# Patient Record
Sex: FEMALE | Race: OTHER | URBAN - METROPOLITAN AREA
[De-identification: names, ages, dates, MRNs, and addresses within clinical notes are randomized per-mention and may not be internally consistent; named-entity substitution may affect disease eponyms.]

---

## 2024-07-10 ENCOUNTER — TELEPHONE (OUTPATIENT)
Age: 29
End: 2024-07-10

## 2024-07-23 ENCOUNTER — INITIAL PRENATAL (OUTPATIENT)
Age: 29
End: 2024-07-23

## 2024-07-23 ENCOUNTER — PROCEDURE VISIT (OUTPATIENT)
Age: 29
End: 2024-07-23

## 2024-07-23 VITALS — WEIGHT: 163 LBS

## 2024-07-23 DIAGNOSIS — O36.80X0 ULTRASOUND SCAN TO CONFIRM FETAL VIABILITY WITH HISTORY OF MISCARRIAGE: Primary | ICD-10-CM

## 2024-07-23 DIAGNOSIS — Z34.83 ENCOUNTER FOR SUPERVISION OF OTHER NORMAL PREGNANCY IN THIRD TRIMESTER: ICD-10-CM

## 2024-07-23 DIAGNOSIS — Z87.59 ULTRASOUND SCAN TO CONFIRM FETAL VIABILITY WITH HISTORY OF MISCARRIAGE: Primary | ICD-10-CM

## 2024-07-23 DIAGNOSIS — Z34.01 ENCOUNTER FOR SUPERVISION OF NORMAL FIRST PREGNANCY IN FIRST TRIMESTER: ICD-10-CM

## 2024-07-23 DIAGNOSIS — Z34.83 PRENATAL CARE, SUBSEQUENT PREGNANCY IN THIRD TRIMESTER: Primary | ICD-10-CM

## 2024-07-23 DIAGNOSIS — Z3A.26 26 WEEKS GESTATION OF PREGNANCY: ICD-10-CM

## 2024-07-23 LAB
ABO + RH BLD: NORMAL
BLOOD BANK CMNT PATIENT-IMP: NORMAL
BLOOD GROUP ANTIBODIES SERPL: NORMAL
SPECIMEN EXP DATE BLD: NORMAL

## 2024-07-23 RX ORDER — ASPIRIN 81 MG/1
81 TABLET ORAL DAILY
Qty: 90 TABLET | Refills: 0 | OUTPATIENT
Start: 2024-07-23

## 2024-07-23 RX ORDER — PNV NO.95/FERROUS FUM/FOLIC AC 28MG-0.8MG
1 TABLET ORAL DAILY
Qty: 90 TABLET | Refills: 5 | OUTPATIENT
Start: 2024-07-23

## 2024-07-23 NOTE — PROGRESS NOTES
Initial Prenatal Note     CC: Amenorrhea, positive pregnancy test     HPI:  Azul Moreno is a 28 y.o.  who presents for initial prenatal appointment. Since her LMP she has experienced nausea in early pregnancy but is doing better. She denies dysuria, discharge, vaginal bleeding.     LMP history:  The date of her LMP is not certain.  Her last menstrual period was normal.        Ultrasound data:  She had an  ultrasound done by the ultrasound tech today which revealed a viable marmolejo pregnancy with a gestational age of 23 weeks and 6 days giving an EDC of 2024.     She is dated by US.     Relevant past pregnancy history:  She has the following pregnancy history:    She does not history of  delivery.     Relevant past medical history:   Noncontributory       Relevant social history:  Her partner's name is Ciaran.        Initial OB note:  Please refer to problem list for concerns        Substance history: negative for alcohol, tobacco and street drugs.           Positive for nothing.  Exposure history: There is/are no indoor cat/s in the home.  The patient was instructed to not change the cat litter.   She admits close contact with children on a regular basis.   She has had chicken pox or the vaccine in the past.   Patient denies issues with domestic violence.     Genetic Screening/Teratology Counseling: (Includes patient, baby's father, or anyone in either family with:)  1.  Patient's age >/= 35 at EDC?--no  2.  Thalassemia (Dutch, Greek, Mediterranean, or  background): MCV<80?--no.     3.  Neural tube defect (meningomyelocele, spina bifida, anencephaly)?--no.   4.  Congenital heart defect?--no.  5.  Down syndrome?--no.   6.  Camilo-Sachs (Gnosticism, Slovak Belarusian)?--no.   7.  Canavan's Disease?--no.   8.  Familial Dysautonomia?--no.   9.  Sickle cell disease or trait ()?--no   The patient has not been tested for sickle trait  10.  Hemophilia or other blood disorders?--no.   11.

## 2024-07-23 NOTE — PROGRESS NOTES
Initial Prenatal Note    CC: Amenorrhea, positive pregnancy test    HPI:  Azul Moreno is a 28 y.o.  who presents for initial prenatal appointment. Since her LMP she has experienced nausea in early pregnancy but is doing better. She denies dysuria, discharge, vaginal bleeding.    LMP history:  The date of her LMP is not certain.  Her last menstrual period was normal.       Ultrasound data:  She had an  ultrasound done by the ultrasound tech today which revealed a viable marmolejo pregnancy with a gestational age of 23 weeks and 6 days giving an EDC of 2024.    She is dated by US.    Relevant past pregnancy history:  She has the following pregnancy history:    She does not history of  delivery.    Relevant past medical history:   Noncontributory      Relevant social history:  Her partner's name is Ciaran.    1. Have you been to the ER, urgent care clinic, or hospitalized since your last visit?No    2. Have you seen or consulted any other health care providers outside of the [facility] since your last visit? No    She declines  a chaperone during the gynecologic exam today.      : Jeramie  ID# [id number]    : Aisha  ID# [id number]    Elizabeth Mesa MA

## 2024-07-24 LAB
BACTERIA SPEC CULT: NORMAL
ERYTHROCYTE [DISTWIDTH] IN BLOOD BY AUTOMATED COUNT: 14.6 % (ref 11.5–14.5)
HBV SURFACE AG SER QL: <0.1 INDEX
HBV SURFACE AG SER QL: NEGATIVE
HCT VFR BLD AUTO: 32.1 % (ref 35–47)
HCV AB SER IA-ACNC: 0.09 INDEX
HCV AB SERPL QL IA: NONREACTIVE
HGB BLD-MCNC: 10.5 G/DL (ref 11.5–16)
HIV 1+2 AB+HIV1 P24 AG SERPL QL IA: NONREACTIVE
HIV 1/2 RESULT COMMENT: NORMAL
MCH RBC QN AUTO: 28.3 PG (ref 26–34)
MCHC RBC AUTO-ENTMCNC: 32.7 G/DL (ref 30–36.5)
MCV RBC AUTO: 86.5 FL (ref 80–99)
NRBC # BLD: 0 K/UL (ref 0–0.01)
NRBC BLD-RTO: 0 PER 100 WBC
PLATELET # BLD AUTO: 246 K/UL (ref 150–400)
PMV BLD AUTO: 10.8 FL (ref 8.9–12.9)
RBC # BLD AUTO: 3.71 M/UL (ref 3.8–5.2)
RUBV IGG SERPL IA-ACNC: NORMAL IU/ML
SERVICE CMNT-IMP: NORMAL
WBC # BLD AUTO: 6.4 K/UL (ref 3.6–11)

## 2024-07-26 LAB
HGB A MFR BLD: 97.7 % (ref 96.4–98.8)
HGB A2 MFR BLD COLUMN CHROM: 2.3 % (ref 1.8–3.2)
HGB F MFR BLD: 0 % (ref 0–2)
HGB FRACT BLD-IMP: NORMAL
HGB S MFR BLD: 0 %
T PALLIDUM AB SER QL IA: NON REACTIVE
VZV IGG SER IA-ACNC: 1456 INDEX

## 2024-07-29 LAB
C TRACH RRNA SPEC QL NAA+PROBE: NEGATIVE
N GONORRHOEA RRNA SPEC QL NAA+PROBE: NEGATIVE
SPECIMEN SOURCE: NORMAL
T VAGINALIS RRNA SPEC QL NAA+PROBE: NEGATIVE

## 2024-08-08 LAB — T PALLIDUM AB SER QL IA: NORMAL

## 2024-08-20 ENCOUNTER — ROUTINE PRENATAL (OUTPATIENT)
Age: 29
End: 2024-08-20

## 2024-08-20 ENCOUNTER — LAB (OUTPATIENT)
Age: 29
End: 2024-08-20

## 2024-08-20 VITALS — WEIGHT: 165 LBS | SYSTOLIC BLOOD PRESSURE: 118 MMHG | DIASTOLIC BLOOD PRESSURE: 80 MMHG

## 2024-08-20 DIAGNOSIS — Z3A.27 27 WEEKS GESTATION OF PREGNANCY: ICD-10-CM

## 2024-08-20 DIAGNOSIS — Z3A.26 26 WEEKS GESTATION OF PREGNANCY: ICD-10-CM

## 2024-08-20 DIAGNOSIS — Z3A.27 27 WEEKS GESTATION OF PREGNANCY: Primary | ICD-10-CM

## 2024-08-20 PROCEDURE — 0502F SUBSEQUENT PRENATAL CARE: CPT

## 2024-08-20 RX ORDER — MAGNESIUM OXIDE 400 MG/1
400 TABLET ORAL DAILY
Qty: 30 TABLET | Refills: 1 | Status: SHIPPED | OUTPATIENT
Start: 2024-08-20

## 2024-08-20 RX ORDER — PNV NO.95/FERROUS FUM/FOLIC AC 28MG-0.8MG
1 TABLET ORAL DAILY
Qty: 90 TABLET | Refills: 5 | Status: SHIPPED | OUTPATIENT
Start: 2024-08-20

## 2024-08-20 SDOH — ECONOMIC STABILITY: FOOD INSECURITY: WITHIN THE PAST 12 MONTHS, YOU WORRIED THAT YOUR FOOD WOULD RUN OUT BEFORE YOU GOT MONEY TO BUY MORE.: NEVER TRUE

## 2024-08-20 SDOH — ECONOMIC STABILITY: FOOD INSECURITY: WITHIN THE PAST 12 MONTHS, THE FOOD YOU BOUGHT JUST DIDN'T LAST AND YOU DIDN'T HAVE MONEY TO GET MORE.: NEVER TRUE

## 2024-08-20 SDOH — ECONOMIC STABILITY: INCOME INSECURITY: HOW HARD IS IT FOR YOU TO PAY FOR THE VERY BASICS LIKE FOOD, HOUSING, MEDICAL CARE, AND HEATING?: NOT VERY HARD

## 2024-08-20 ASSESSMENT — PATIENT HEALTH QUESTIONNAIRE - PHQ9
SUM OF ALL RESPONSES TO PHQ QUESTIONS 1-9: 1
SUM OF ALL RESPONSES TO PHQ9 QUESTIONS 1 & 2: 1
1. LITTLE INTEREST OR PLEASURE IN DOING THINGS: NOT AT ALL
2. FEELING DOWN, DEPRESSED OR HOPELESS: SEVERAL DAYS
SUM OF ALL RESPONSES TO PHQ QUESTIONS 1-9: 1

## 2024-08-20 NOTE — PROGRESS NOTES
EUGENIO at 27w6d.  Doing well, FM+, denies LOF/VB/cxns.  PNV, mag ox sent for leg cramping.  Pt requesting housing assistance information per survey--pt info forwarded to Iman to manage.   Gtt, third tri today.  Considering TDAP.   Discussed increasing water, protein.  RTO 2 wks.  Sae Brasher, LENORA - CNM

## 2024-08-21 LAB
ERYTHROCYTE [DISTWIDTH] IN BLOOD BY AUTOMATED COUNT: 15.1 % (ref 11.5–14.5)
GLUCOSE 1H P 100 G GLC PO SERPL-MCNC: 212 MG/DL (ref 65–140)
HCT VFR BLD AUTO: 31.2 % (ref 35–47)
HGB BLD-MCNC: 9.9 G/DL (ref 11.5–16)
HIV 1+2 AB+HIV1 P24 AG SERPL QL IA: NONREACTIVE
HIV 1/2 RESULT COMMENT: NORMAL
MCH RBC QN AUTO: 28.7 PG (ref 26–34)
MCHC RBC AUTO-ENTMCNC: 31.7 G/DL (ref 30–36.5)
MCV RBC AUTO: 90.4 FL (ref 80–99)
NRBC # BLD: 0 K/UL (ref 0–0.01)
NRBC BLD-RTO: 0 PER 100 WBC
PLATELET # BLD AUTO: 222 K/UL (ref 150–400)
PMV BLD AUTO: 11.8 FL (ref 8.9–12.9)
RBC # BLD AUTO: 3.45 M/UL (ref 3.8–5.2)
RPR SER QL: NONREACTIVE
WBC # BLD AUTO: 6.2 K/UL (ref 3.6–11)

## 2024-08-23 DIAGNOSIS — O24.419 GESTATIONAL DIABETES MELLITUS (GDM) IN THIRD TRIMESTER, GESTATIONAL DIABETES METHOD OF CONTROL UNSPECIFIED: Primary | ICD-10-CM

## 2024-08-23 RX ORDER — GLUCOSAMINE HCL/CHONDROITIN SU 500-400 MG
CAPSULE ORAL
Qty: 100 STRIP | Refills: 2 | Status: SHIPPED | OUTPATIENT
Start: 2024-08-23

## 2024-08-23 RX ORDER — LANCETS 30 GAUGE
1 EACH MISCELLANEOUS 4 TIMES DAILY
Qty: 100 EACH | Refills: 2 | Status: SHIPPED | OUTPATIENT
Start: 2024-08-23

## 2024-09-03 ENCOUNTER — ROUTINE PRENATAL (OUTPATIENT)
Age: 29
End: 2024-09-03

## 2024-09-03 VITALS
OXYGEN SATURATION: 96 % | RESPIRATION RATE: 16 BRPM | SYSTOLIC BLOOD PRESSURE: 106 MMHG | HEART RATE: 84 BPM | WEIGHT: 165 LBS | HEIGHT: 61 IN | BODY MASS INDEX: 31.15 KG/M2 | TEMPERATURE: 98.4 F | DIASTOLIC BLOOD PRESSURE: 73 MMHG

## 2024-09-03 DIAGNOSIS — Z34.93 PRENATAL CARE IN THIRD TRIMESTER: ICD-10-CM

## 2024-09-03 DIAGNOSIS — O24.410 DIET CONTROLLED GESTATIONAL DIABETES MELLITUS (GDM) IN THIRD TRIMESTER: Primary | ICD-10-CM

## 2024-09-03 DIAGNOSIS — Z3A.29 29 WEEKS GESTATION OF PREGNANCY: ICD-10-CM

## 2024-09-03 PROCEDURE — 0502F SUBSEQUENT PRENATAL CARE: CPT | Performed by: ADVANCED PRACTICE MIDWIFE

## 2024-09-03 NOTE — PROGRESS NOTES
EUGENIO:  Azul Moreno is a 29 y.o.  at 29w6d  who presents for routine OB appointment    Chief Complaint   Patient presents with    Routine Prenatal Visit          /73   Pulse 84   Temp 98.4 °F (36.9 °C)   Resp 16   Ht 1.549 m (5' 1\")   Wt 74.8 kg (165 lb)   LMP  (LMP Unknown)   SpO2 96%   BMI 31.18 kg/m²   FHTs: 150s  FH: 30    Tdap reviewed, Pt desires today., but left prior to getting it    ABO: B POSITIVE     RhoGAM: N/A     Subjective: Doing well, no complaints. Good FM. Denies vaginal bleeding, Leaking of fluid, regular contractions.    Still has not see DM educator, we called to get patient scheduled, she now has a appointment  at 9:30 AM instructions given how to get there.  She was instructed to bring her glucometer and they will teach her how to use it.     Third trimester precautions given.   labor precautions reviewed.     Follow up in 2 weeks.     LENORA Bermudez CNM

## 2024-09-11 ENCOUNTER — TELEPHONE (OUTPATIENT)
Age: 29
End: 2024-09-11

## 2024-09-12 ENCOUNTER — ROUTINE PRENATAL (OUTPATIENT)
Age: 29
End: 2024-09-12
Payer: COMMERCIAL

## 2024-09-12 VITALS — DIASTOLIC BLOOD PRESSURE: 75 MMHG | SYSTOLIC BLOOD PRESSURE: 109 MMHG | HEART RATE: 79 BPM

## 2024-09-12 DIAGNOSIS — O24.919 DIABETES MELLITUS DURING PREGNANCY, ANTEPARTUM, UNSPECIFIED DIABETES MELLITUS TYPE: Primary | ICD-10-CM

## 2024-09-12 PROCEDURE — 99204 OFFICE O/P NEW MOD 45 MIN: CPT | Performed by: OBSTETRICS & GYNECOLOGY

## 2024-09-12 PROCEDURE — 76811 OB US DETAILED SNGL FETUS: CPT | Performed by: OBSTETRICS & GYNECOLOGY

## 2024-09-17 ENCOUNTER — ROUTINE PRENATAL (OUTPATIENT)
Age: 29
End: 2024-09-17
Payer: COMMERCIAL

## 2024-09-17 VITALS
BODY MASS INDEX: 31.04 KG/M2 | HEIGHT: 61 IN | HEART RATE: 81 BPM | TEMPERATURE: 97.9 F | WEIGHT: 164.4 LBS | SYSTOLIC BLOOD PRESSURE: 122 MMHG | RESPIRATION RATE: 16 BRPM | DIASTOLIC BLOOD PRESSURE: 77 MMHG | OXYGEN SATURATION: 96 %

## 2024-09-17 DIAGNOSIS — O24.419 GESTATIONAL DIABETES MELLITUS (GDM) IN THIRD TRIMESTER, GESTATIONAL DIABETES METHOD OF CONTROL UNSPECIFIED: Primary | ICD-10-CM

## 2024-09-17 DIAGNOSIS — Z3A.31 31 WEEKS GESTATION OF PREGNANCY: ICD-10-CM

## 2024-09-17 DIAGNOSIS — Z3A.26 26 WEEKS GESTATION OF PREGNANCY: ICD-10-CM

## 2024-09-17 DIAGNOSIS — Z34.93 PRENATAL CARE IN THIRD TRIMESTER: ICD-10-CM

## 2024-09-17 PROCEDURE — 90471 IMMUNIZATION ADMIN: CPT | Performed by: ADVANCED PRACTICE MIDWIFE

## 2024-09-17 PROCEDURE — 90715 TDAP VACCINE 7 YRS/> IM: CPT | Performed by: ADVANCED PRACTICE MIDWIFE

## 2024-09-17 PROCEDURE — 0502F SUBSEQUENT PRENATAL CARE: CPT | Performed by: ADVANCED PRACTICE MIDWIFE

## 2024-09-20 ENCOUNTER — TELEMEDICINE (OUTPATIENT)
Age: 29
End: 2024-09-20

## 2024-09-20 DIAGNOSIS — O24.919 DIABETES MELLITUS DURING PREGNANCY, ANTEPARTUM, UNSPECIFIED DIABETES MELLITUS TYPE: ICD-10-CM

## 2024-09-20 DIAGNOSIS — O24.410 DIET CONTROLLED GESTATIONAL DIABETES MELLITUS (GDM), ANTEPARTUM: Primary | ICD-10-CM

## 2024-09-20 PROBLEM — O24.419 GDM (GESTATIONAL DIABETES MELLITUS): Status: ACTIVE | Noted: 2024-09-20

## 2024-10-02 ENCOUNTER — ROUTINE PRENATAL (OUTPATIENT)
Age: 29
End: 2024-10-02

## 2024-10-02 VITALS
HEART RATE: 77 BPM | WEIGHT: 166.6 LBS | DIASTOLIC BLOOD PRESSURE: 53 MMHG | OXYGEN SATURATION: 98 % | HEIGHT: 61 IN | BODY MASS INDEX: 31.45 KG/M2 | TEMPERATURE: 98 F | SYSTOLIC BLOOD PRESSURE: 98 MMHG | RESPIRATION RATE: 16 BRPM

## 2024-10-02 DIAGNOSIS — Z3A.26 26 WEEKS GESTATION OF PREGNANCY: Primary | ICD-10-CM

## 2024-10-02 PROCEDURE — 0502F SUBSEQUENT PRENATAL CARE: CPT

## 2024-10-02 NOTE — PROGRESS NOTES
EUGENIO at 34wk.  Doing well. FM+, denies LOF/VB/cxns.  Blood sugar log reviewed 3/12 fastings abnormal.  Pt having some low blood sugar readings, notes she has no appetite. Reviewed increasing low carb calories and water. Pt has gained little weight this pregnancy. Reviewed the importance of smart snacking, can consider DE. She previously declined DE. Some values seem inaccurate, pt describes taking sugars correctly. Uncertain if due to monitor. Recommending DE for assessment.    Encouraged to bring logs to [facility] tomorrow.   RTO 2 wks.   Sae Brasher, LENORA - CNM

## 2024-10-03 ENCOUNTER — ROUTINE PRENATAL (OUTPATIENT)
Age: 29
End: 2024-10-03
Payer: COMMERCIAL

## 2024-10-03 ENCOUNTER — TELEPHONE (OUTPATIENT)
Age: 29
End: 2024-10-03

## 2024-10-03 VITALS — DIASTOLIC BLOOD PRESSURE: 72 MMHG | HEART RATE: 79 BPM | SYSTOLIC BLOOD PRESSURE: 106 MMHG

## 2024-10-03 DIAGNOSIS — O24.419 GESTATIONAL DIABETES MELLITUS (GDM), ANTEPARTUM, GESTATIONAL DIABETES METHOD OF CONTROL UNSPECIFIED: Primary | ICD-10-CM

## 2024-10-03 PROCEDURE — 99214 OFFICE O/P EST MOD 30 MIN: CPT

## 2024-10-03 PROCEDURE — 76819 FETAL BIOPHYS PROFIL W/O NST: CPT | Performed by: STUDENT IN AN ORGANIZED HEALTH CARE EDUCATION/TRAINING PROGRAM

## 2024-10-03 PROCEDURE — 99213 OFFICE O/P EST LOW 20 MIN: CPT | Performed by: STUDENT IN AN ORGANIZED HEALTH CARE EDUCATION/TRAINING PROGRAM

## 2024-10-03 NOTE — PROGRESS NOTES
Patient was seen 10/3/2024      Please look under media to view full consult and ultrasound report in ViewPoint.         Tiffani Funes MD   Maternal Fetal Medicine

## 2024-10-03 NOTE — PROCEDURES
PATIENT: SHEELA ASCENCIO   -  : 1995   -  DOS:10/03/2024   -  INTERPRETING PROVIDER:Tiffani Funes,   Indication  ========    Gestational diabetes A1    Method  ======    Transabdominal ultrasound examination. View: Sufficient    Pregnancy  =========    Garibay pregnancy. Number of fetuses: 1    Dating  ======    Previous Ultrasound on: 2024  Type of prior assessment: GA  GA at prior assessment date 23 w + 6 d  GA by previous U/S 34 w + 1 d  CHRISTOPHER by previous Ultrasound: 2024  Assigned: based on ultrasound (GA), selected on 2024  Assigned GA 34 w + 1 d  Assigned CHRISTOPHER: 2024    General Evaluation  ==============    Cardiac activity present.  bpm. Fetal movements: visualized. Presentation: Cephalic  Placenta: Placental site: anterior, appropriate distance from the internal os  Umbilical cord: Cord vessels: 3 vessel cord    Fetal Anatomy  ===========    Head / Neck  Neck: NOT VISUALIZED  Heart / Thorax  Aortic arch view: NOT VISUALIZED  Bicaval view: NOT VISUALIZED  Ductal arch view: NOT VISUALIZED  Stomach: normal  Kidneys: normal  Bladder: normal    Amniotic Fluid Assessment  =====================    Amount of AF: normal  MVP 6.0 cm    Biophysical Profile  ==============    2: Fetal breathing movements  2: Gross body movements  2: Fetal tone  2: Amniotic fluid volume  8/8 Biophysical profile score    Findings  =======    Intrauterine Garibay pregnancy at 34w 1d by clinical dates.  Amniotic fluid: normal.  Placenta is anterior, appropriate distance from the internal os.  Biophysical profile score is 8/8.  Cephalic presentation.    Consultation  ==========    NP note 10/3/24    [facility]  utilized: Aisha # [id number]    SHEELA is a 29-yo  who is seen for a pregnancy complicated by:    GDM A1 vs GDM A2  - Prev counseled, see prior note.  - Log reviewed: Fasting () 2 hr pp (). Pt denies hypoglycemic episodes. Discussed that low readings are unlikely

## 2024-10-03 NOTE — TELEPHONE ENCOUNTER
I spoke with patient's spouse, HIPAA verified. He stated they are unable to attend Diabetic Education due to his work schedule and multiple appointments. I have informed Sae Brasher CNM. ( ID#[id number])

## 2024-10-03 NOTE — TELEPHONE ENCOUNTER
----- Message from Sae MELGAR sent at 10/2/2024  7:53 PM EDT -----  Please call pt and let her know I am recommending she go to see DE. She had originally declined. I am not sure she is taking her sugars right and she has gained almost no weight. Her fastings are wonky as well.     Sae Brasher, LENORA - ANNELISEM

## 2024-10-24 ENCOUNTER — TELEPHONE (OUTPATIENT)
Age: 29
End: 2024-10-24

## 2024-10-24 ENCOUNTER — ROUTINE PRENATAL (OUTPATIENT)
Age: 29
End: 2024-10-24
Payer: COMMERCIAL

## 2024-10-24 VITALS — HEART RATE: 64 BPM | DIASTOLIC BLOOD PRESSURE: 67 MMHG | SYSTOLIC BLOOD PRESSURE: 115 MMHG

## 2024-10-24 DIAGNOSIS — Z3A.37 37 WEEKS GESTATION OF PREGNANCY: Primary | ICD-10-CM

## 2024-10-24 PROCEDURE — 76819 FETAL BIOPHYS PROFIL W/O NST: CPT | Performed by: OBSTETRICS & GYNECOLOGY

## 2024-10-24 PROCEDURE — 76816 OB US FOLLOW-UP PER FETUS: CPT | Performed by: OBSTETRICS & GYNECOLOGY

## 2024-10-24 PROCEDURE — 99213 OFFICE O/P EST LOW 20 MIN: CPT | Performed by: OBSTETRICS & GYNECOLOGY

## 2024-10-24 NOTE — TELEPHONE ENCOUNTER
Spoke w/  and patient to confirm that they were planning to come to appointment today. Confirmed name and .  Moved appointment from 11:15 to 1:00 today at [facility]

## 2024-10-25 ENCOUNTER — ROUTINE PRENATAL (OUTPATIENT)
Age: 29
End: 2024-10-25

## 2024-10-25 VITALS
HEART RATE: 70 BPM | BODY MASS INDEX: 31.6 KG/M2 | DIASTOLIC BLOOD PRESSURE: 70 MMHG | HEIGHT: 61 IN | OXYGEN SATURATION: 98 % | TEMPERATURE: 97.9 F | SYSTOLIC BLOOD PRESSURE: 100 MMHG | WEIGHT: 167.4 LBS | RESPIRATION RATE: 16 BRPM

## 2024-10-25 DIAGNOSIS — Z3A.26 26 WEEKS GESTATION OF PREGNANCY: ICD-10-CM

## 2024-10-25 DIAGNOSIS — Z34.93 PRENATAL CARE IN THIRD TRIMESTER: Primary | ICD-10-CM

## 2024-10-25 NOTE — PROCEDURES
above.  Amniotic fluid: normal.  Placenta is anterior, appropriate distance from the internal os.  Cephalic presentation.  BPP 8/8  We reviewed the findings and discussed the diagnostic limitations of the obstetric ultrasound.    Consultation  ==========    Michelle  utilized: Izzy # [id number]    SHEELA is a 29-yo  who is seen for a pregnancy complicated by:    GDM A1  - Prev counseled, see prior note.  - Log reviewed: overall good control on diet alone  - Offered diabetes education which was declined.  - Patient instructed to bring log to weekly appt to determine need for insulin.  - Kick count instructions, PIH and labor precautions reviewed.    cffDNA was low-risk.    Patient was counseled on the findings. Questions and concerns were addressed.  Excluding time reading the ultrasound, a total of 25 minutes was spent on this visit reviewing previous notes, counseling the patient and documenting the findings in the  note.    Recommendations  ==============    -BPP in 1 week  -Women with good glycemic control through diet do not require delivery before 39 0/7 and can be expectantly managed up to 40 6/7.    Coding  ======    Code: 42755  Description: Ultrasound, pregnant uterus, real time with image documentation, follow up, transabdominal approach per fetus  Code: 80457  Description: Fetal biophysical profile; without non-stress testing

## 2024-10-25 NOTE — PROGRESS NOTES
EUGENIO 37.2      Blood sugars WNL.  Seeing MFM.  No meds.  Fasting 88, 109 pp today.  Reviewed labor precautions and where to go when contractions start or water breaks.  GBS today  EUGENIO 1 week

## 2024-10-28 LAB
GP B STREP DNA SPEC QL NAA+PROBE: NEGATIVE
SPECIMEN SOURCE: NORMAL

## 2024-10-31 ENCOUNTER — ROUTINE PRENATAL (OUTPATIENT)
Age: 29
End: 2024-10-31

## 2024-10-31 ENCOUNTER — TELEPHONE (OUTPATIENT)
Age: 29
End: 2024-10-31

## 2024-10-31 ENCOUNTER — ROUTINE PRENATAL (OUTPATIENT)
Age: 29
End: 2024-10-31
Payer: COMMERCIAL

## 2024-10-31 VITALS
SYSTOLIC BLOOD PRESSURE: 102 MMHG | WEIGHT: 167 LBS | DIASTOLIC BLOOD PRESSURE: 67 MMHG | OXYGEN SATURATION: 97 % | BODY MASS INDEX: 31.55 KG/M2 | HEART RATE: 83 BPM

## 2024-10-31 VITALS — SYSTOLIC BLOOD PRESSURE: 130 MMHG | HEART RATE: 89 BPM | DIASTOLIC BLOOD PRESSURE: 84 MMHG

## 2024-10-31 DIAGNOSIS — Z60.3 LANGUAGE BARRIER: ICD-10-CM

## 2024-10-31 DIAGNOSIS — O24.419 GESTATIONAL DIABETES MELLITUS (GDM), ANTEPARTUM, GESTATIONAL DIABETES METHOD OF CONTROL UNSPECIFIED: Primary | ICD-10-CM

## 2024-10-31 DIAGNOSIS — Z75.8 LANGUAGE BARRIER: ICD-10-CM

## 2024-10-31 DIAGNOSIS — Z3A.38 38 WEEKS GESTATION OF PREGNANCY: ICD-10-CM

## 2024-10-31 DIAGNOSIS — O99.212 OTHER OBESITY AFFECTING PREGNANCY IN SECOND TRIMESTER: ICD-10-CM

## 2024-10-31 DIAGNOSIS — Z3A.38 38 WEEKS GESTATION OF PREGNANCY: Primary | ICD-10-CM

## 2024-10-31 DIAGNOSIS — E66.89 OTHER OBESITY AFFECTING PREGNANCY IN SECOND TRIMESTER: ICD-10-CM

## 2024-10-31 PROCEDURE — 76819 FETAL BIOPHYS PROFIL W/O NST: CPT | Performed by: OBSTETRICS & GYNECOLOGY

## 2024-10-31 PROCEDURE — 0502F SUBSEQUENT PRENATAL CARE: CPT

## 2024-10-31 PROCEDURE — 99214 OFFICE O/P EST MOD 30 MIN: CPT | Performed by: OBSTETRICS & GYNECOLOGY

## 2024-10-31 RX ORDER — ASPIRIN 81 MG/1
81 TABLET ORAL DAILY
Qty: 90 TABLET | Refills: 0 | Status: SHIPPED | OUTPATIENT
Start: 2024-10-31

## 2024-10-31 SDOH — SOCIAL STABILITY - SOCIAL INSECURITY: ACCULTURATION DIFFICULTY: Z60.3

## 2024-10-31 NOTE — PROCEDURES
PATIENT: SHEELA ASCENCIO   -  : 1995   -  DOS:10/31/2024   -  INTERPRETING PROVIDER:Javed Martin,   Indication  ========    Gestational diabetes A1/A2. Grand multiparity.    Method  ======    Transabdominal ultrasound examination. View: Suboptimal view: limited by fetal position    Pregnancy  =========    Garibay pregnancy. Number of fetuses: 1    Dating  ======    Previous Ultrasound on: 2024  Type of prior assessment: GA  GA at prior assessment date 23 w + 6 d  GA by previous U/S 38 w + 1 d  CHRISTOPHER by previous Ultrasound: 2024  Assigned: based on ultrasound (GA), selected on 2024  Assigned GA 38 w + 1 d  Assigned CHRISTOPHER: 2024    General Evaluation  ==============    Cardiac activity present.  bpm. Fetal movements: visualized. Presentation: Cephalic  Placenta: Placental site: anterior, appropriate distance from the internal os  Umbilical cord: Cord vessels: 3 vessel cord    Fetal Anatomy  ===========    Head / Neck  Neck: NOT VISUALIZED  Heart / Thorax  Aortic arch view: NOT VISUALIZED  Bicaval view: NOT VISUALIZED  Ductal arch view: normal  Stomach: normal  Kidneys: normal  Bladder: normal  Wants to know fetal sex: yes    Amniotic Fluid Assessment  =====================    Amount of AF: normal  MVP 5.9 cm. CAMILLA 9.9 cm. Q1 0.0 cm, Q2 5.9 cm, Q3 4.0 cm, Q4 0.0 cm    Biophysical Profile  ==============    2: Fetal breathing movements  2: Gross body movements  2: Fetal tone  2: Amniotic fluid volume  8/8 Biophysical profile score  Interpretation: normal    Findings  =======    Intrauterine Garibay pregnancy at 38w 1d by clinical dates.  Amniotic fluid: normal.  Placenta is anterior, appropriate distance from the internal os.  Biophysical profile score is 8/8.  Cephalic presentation.    Garibay living intrauterine pregnancy at 38 weeks and 1 day.  Limited fetal anatomic survey.  Reassuring ultrasound fetal biophysical profile score. Although these studies are reassuring, they

## 2024-10-31 NOTE — PROGRESS NOTES
EUGENIO at 38w1d.  Doing well. FM+, denies LOF/VB/cxns.   Saw MFM today, starting Metformin.   IOL requested for 11/6 at 39 weeks due to uncontrolled blood glucose levels.  Reviewed SOL and warning s/s.  To hospital for IOL next week.     Sae Brasher, LENORA - ANNELISEM

## 2024-11-05 ENCOUNTER — ROUTINE PRENATAL (OUTPATIENT)
Age: 29
End: 2024-11-05
Payer: COMMERCIAL

## 2024-11-05 VITALS — SYSTOLIC BLOOD PRESSURE: 115 MMHG | DIASTOLIC BLOOD PRESSURE: 75 MMHG | HEART RATE: 84 BPM

## 2024-11-05 DIAGNOSIS — O24.415 GESTATIONAL DIABETES MELLITUS (GDM) CONTROLLED ON ORAL HYPOGLYCEMIC DRUG, ANTEPARTUM: Primary | ICD-10-CM

## 2024-11-05 PROCEDURE — 99213 OFFICE O/P EST LOW 20 MIN: CPT | Performed by: OBSTETRICS & GYNECOLOGY

## 2024-11-05 PROCEDURE — 76818 FETAL BIOPHYS PROFILE W/NST: CPT | Performed by: OBSTETRICS & GYNECOLOGY

## 2024-11-05 NOTE — PROCEDURES
PATIENT: SHEELA ASCENCIO   -  : 1995   -  DOS:2024   -  INTERPRETING PROVIDER:Deepthi Josue,   Indication  ========    Gestational diabetes A1/A2. Grand multiparity.    Method  ======    External Fetal Monitor and transabdominal ultrasound examination. View: Sufficient    Pregnancy  =========    Garibay pregnancy. Number of fetuses: 1    Dating  ======    Previous Ultrasound on: 2024  Type of prior assessment: GA  GA at prior assessment date 23 w + 6 d  GA by previous U/S 38 w + 6 d  CHRISTOPHER by previous Ultrasound: 2024  Assigned: based on ultrasound (GA), selected on 2024  Assigned GA 38 w + 6 d  Assigned CHRISTOPHER: 2024    General Evaluation  ==============    Cardiac activity present.  bpm. Fetal movements: visualized. Presentation: Cephalic  Placenta: Placental site: anterior, appropriate distance from the internal os  Umbilical cord: Cord vessels: 3 vessel cord    Fetal Anatomy  ===========    Head / Neck  Neck: NOT VISUALIZED  Heart / Thorax  Aortic arch view: NOT VISUALIZED  Bicaval view: NOT VISUALIZED  Ductal arch view: NOT VISUALIZED  Stomach: normal  Kidneys: normal  Bladder: normal  Wants to know fetal sex: yes    Amniotic Fluid Assessment  =====================    Amount of AF: normal  MVP 3.2 cm. CAMILLA 9.5 cm. Q1 3.2 cm, Q2 0.0 cm, Q3 3.2 cm, Q4 3.2 cm    Biophysical Profile  ==============    2: Fetal breathing movements  2: Gross body movements  2: Fetal tone  2: Amniotic fluid volume  NST: reactive  10/10 Biophysical profile score    Non Stress Test  =============    NST interpretation: reactive. Test duration 20 min. Baseline  bpm. Baseline variability: moderate. Accelerations: present. Decelerations: absent. Uterine activity:  absent    Findings  =======    Intrauterine Garibay pregnancy at 38w 6d by clinical dates.  Amniotic fluid: normal.  Placenta is anterior, appropriate distance from the internal os.  Cephalic presentation.    Biophysical

## 2024-11-06 ENCOUNTER — HOSPITAL ENCOUNTER (INPATIENT)
Facility: HOSPITAL | Age: 29
LOS: 1 days | Discharge: HOME | DRG: 560 | End: 2024-11-07
Attending: OBSTETRICS & GYNECOLOGY | Admitting: OBSTETRICS & GYNECOLOGY
Payer: COMMERCIAL

## 2024-11-06 PROBLEM — Z34.90 ENCOUNTER FOR PLANNED INDUCTION OF LABOR: Status: ACTIVE | Noted: 2024-11-06

## 2024-11-06 LAB
ABO + RH BLD: NORMAL
BLOOD GROUP ANTIBODIES SERPL: NORMAL
ERYTHROCYTE [DISTWIDTH] IN BLOOD BY AUTOMATED COUNT: 14 % (ref 11.5–14.5)
GLUCOSE BLD STRIP.AUTO-MCNC: 67 MG/DL (ref 65–117)
GLUCOSE BLD STRIP.AUTO-MCNC: 84 MG/DL (ref 65–117)
HCT VFR BLD AUTO: 29.6 % (ref 35–47)
HGB BLD-MCNC: 9.8 G/DL (ref 11.5–16)
MCH RBC QN AUTO: 29.1 PG (ref 26–34)
MCHC RBC AUTO-ENTMCNC: 33.1 G/DL (ref 30–36.5)
MCV RBC AUTO: 87.8 FL (ref 80–99)
NRBC # BLD: 0 K/UL (ref 0–0.01)
NRBC BLD-RTO: 0 PER 100 WBC
PLATELET # BLD AUTO: 186 K/UL (ref 150–400)
PMV BLD AUTO: 11.4 FL (ref 8.9–12.9)
RBC # BLD AUTO: 3.37 M/UL (ref 3.8–5.2)
RPR SER QL: NONREACTIVE
SERVICE CMNT-IMP: NORMAL
SERVICE CMNT-IMP: NORMAL
SPECIMEN EXP DATE BLD: NORMAL
WBC # BLD AUTO: 5 K/UL (ref 3.6–11)

## 2024-11-06 PROCEDURE — 86901 BLOOD TYPING SEROLOGIC RH(D): CPT

## 2024-11-06 PROCEDURE — 86900 BLOOD TYPING SEROLOGIC ABO: CPT

## 2024-11-06 PROCEDURE — 86592 SYPHILIS TEST NON-TREP QUAL: CPT

## 2024-11-06 PROCEDURE — 6370000000 HC RX 637 (ALT 250 FOR IP): Performed by: ADVANCED PRACTICE MIDWIFE

## 2024-11-06 PROCEDURE — 10907ZC DRAINAGE OF AMNIOTIC FLUID, THERAPEUTIC FROM PRODUCTS OF CONCEPTION, VIA NATURAL OR ARTIFICIAL OPENING: ICD-10-PCS | Performed by: ADVANCED PRACTICE MIDWIFE

## 2024-11-06 PROCEDURE — APPNB45 APP NON BILLABLE 31-45 MINUTES: Performed by: ADVANCED PRACTICE MIDWIFE

## 2024-11-06 PROCEDURE — 82962 GLUCOSE BLOOD TEST: CPT

## 2024-11-06 PROCEDURE — 2580000003 HC RX 258: Performed by: ADVANCED PRACTICE MIDWIFE

## 2024-11-06 PROCEDURE — 36415 COLL VENOUS BLD VENIPUNCTURE: CPT

## 2024-11-06 PROCEDURE — 85027 COMPLETE CBC AUTOMATED: CPT

## 2024-11-06 PROCEDURE — 7100000001 HC PACU RECOVERY - ADDTL 15 MIN

## 2024-11-06 PROCEDURE — 7100000000 HC PACU RECOVERY - FIRST 15 MIN

## 2024-11-06 PROCEDURE — 7220000101 HC DELIVERY VAGINAL/SINGLE

## 2024-11-06 PROCEDURE — 6360000002 HC RX W HCPCS: Performed by: ADVANCED PRACTICE MIDWIFE

## 2024-11-06 PROCEDURE — 59400 OBSTETRICAL CARE: CPT | Performed by: ADVANCED PRACTICE MIDWIFE

## 2024-11-06 PROCEDURE — 86850 RBC ANTIBODY SCREEN: CPT

## 2024-11-06 PROCEDURE — 7210000100 HC LABOR FEE PER 1 HR

## 2024-11-06 PROCEDURE — APPNB30 APP NON BILLABLE TIME 0-30 MINS: Performed by: ADVANCED PRACTICE MIDWIFE

## 2024-11-06 PROCEDURE — 1120000000 HC RM PRIVATE OB

## 2024-11-06 RX ORDER — ONDANSETRON 4 MG/1
4 TABLET, ORALLY DISINTEGRATING ORAL EVERY 6 HOURS PRN
Status: DISCONTINUED | OUTPATIENT
Start: 2024-11-06 | End: 2024-11-06 | Stop reason: SDUPTHER

## 2024-11-06 RX ORDER — ONDANSETRON 4 MG/1
4 TABLET, ORALLY DISINTEGRATING ORAL EVERY 6 HOURS PRN
Status: DISCONTINUED | OUTPATIENT
Start: 2024-11-06 | End: 2024-11-07 | Stop reason: HOSPADM

## 2024-11-06 RX ORDER — SODIUM CHLORIDE 0.9 % (FLUSH) 0.9 %
5-40 SYRINGE (ML) INJECTION PRN
Status: DISCONTINUED | OUTPATIENT
Start: 2024-11-06 | End: 2024-11-07 | Stop reason: HOSPADM

## 2024-11-06 RX ORDER — METHYLERGONOVINE MALEATE 0.2 MG/ML
200 INJECTION INTRAVENOUS PRN
Status: DISCONTINUED | OUTPATIENT
Start: 2024-11-06 | End: 2024-11-07 | Stop reason: HOSPADM

## 2024-11-06 RX ORDER — SODIUM CHLORIDE, SODIUM LACTATE, POTASSIUM CHLORIDE, AND CALCIUM CHLORIDE .6; .31; .03; .02 G/100ML; G/100ML; G/100ML; G/100ML
500 INJECTION, SOLUTION INTRAVENOUS PRN
Status: DISCONTINUED | OUTPATIENT
Start: 2024-11-06 | End: 2024-11-07 | Stop reason: HOSPADM

## 2024-11-06 RX ORDER — TERBUTALINE SULFATE 1 MG/ML
0.25 INJECTION, SOLUTION SUBCUTANEOUS
Status: ACTIVE | OUTPATIENT
Start: 2024-11-06 | End: 2024-11-07

## 2024-11-06 RX ORDER — DOCUSATE SODIUM 100 MG/1
100 CAPSULE, LIQUID FILLED ORAL 2 TIMES DAILY
Status: DISCONTINUED | OUTPATIENT
Start: 2024-11-06 | End: 2024-11-07 | Stop reason: HOSPADM

## 2024-11-06 RX ORDER — SODIUM CHLORIDE, SODIUM LACTATE, POTASSIUM CHLORIDE, CALCIUM CHLORIDE 600; 310; 30; 20 MG/100ML; MG/100ML; MG/100ML; MG/100ML
INJECTION, SOLUTION INTRAVENOUS CONTINUOUS
Status: DISCONTINUED | OUTPATIENT
Start: 2024-11-06 | End: 2024-11-07 | Stop reason: HOSPADM

## 2024-11-06 RX ORDER — ONDANSETRON 2 MG/ML
4 INJECTION INTRAMUSCULAR; INTRAVENOUS EVERY 6 HOURS PRN
Status: DISCONTINUED | OUTPATIENT
Start: 2024-11-06 | End: 2024-11-07 | Stop reason: HOSPADM

## 2024-11-06 RX ORDER — SODIUM CHLORIDE 0.9 % (FLUSH) 0.9 %
5-40 SYRINGE (ML) INJECTION EVERY 12 HOURS SCHEDULED
Status: DISCONTINUED | OUTPATIENT
Start: 2024-11-06 | End: 2024-11-07 | Stop reason: HOSPADM

## 2024-11-06 RX ORDER — SODIUM CHLORIDE 9 MG/ML
INJECTION, SOLUTION INTRAVENOUS PRN
Status: DISCONTINUED | OUTPATIENT
Start: 2024-11-06 | End: 2024-11-07 | Stop reason: HOSPADM

## 2024-11-06 RX ORDER — MODIFIED LANOLIN
OINTMENT (GRAM) TOPICAL PRN
Status: DISCONTINUED | OUTPATIENT
Start: 2024-11-06 | End: 2024-11-07 | Stop reason: HOSPADM

## 2024-11-06 RX ORDER — MISOPROSTOL 200 UG/1
400 TABLET ORAL PRN
Status: DISCONTINUED | OUTPATIENT
Start: 2024-11-06 | End: 2024-11-07 | Stop reason: HOSPADM

## 2024-11-06 RX ORDER — SODIUM CHLORIDE, SODIUM LACTATE, POTASSIUM CHLORIDE, AND CALCIUM CHLORIDE .6; .31; .03; .02 G/100ML; G/100ML; G/100ML; G/100ML
1000 INJECTION, SOLUTION INTRAVENOUS PRN
Status: DISCONTINUED | OUTPATIENT
Start: 2024-11-06 | End: 2024-11-07 | Stop reason: HOSPADM

## 2024-11-06 RX ORDER — ACETAMINOPHEN 325 MG/1
650 TABLET ORAL EVERY 4 HOURS PRN
Status: DISCONTINUED | OUTPATIENT
Start: 2024-11-06 | End: 2024-11-07 | Stop reason: HOSPADM

## 2024-11-06 RX ORDER — CARBOPROST TROMETHAMINE 250 UG/ML
250 INJECTION, SOLUTION INTRAMUSCULAR PRN
Status: DISCONTINUED | OUTPATIENT
Start: 2024-11-06 | End: 2024-11-07 | Stop reason: HOSPADM

## 2024-11-06 RX ORDER — ONDANSETRON 2 MG/ML
4 INJECTION INTRAMUSCULAR; INTRAVENOUS EVERY 6 HOURS PRN
Status: DISCONTINUED | OUTPATIENT
Start: 2024-11-06 | End: 2024-11-06 | Stop reason: SDUPTHER

## 2024-11-06 RX ORDER — IBUPROFEN 400 MG/1
800 TABLET, FILM COATED ORAL EVERY 8 HOURS SCHEDULED
Status: DISCONTINUED | OUTPATIENT
Start: 2024-11-06 | End: 2024-11-07 | Stop reason: HOSPADM

## 2024-11-06 RX ORDER — ACETAMINOPHEN 500 MG
1000 TABLET ORAL EVERY 8 HOURS SCHEDULED
Status: DISCONTINUED | OUTPATIENT
Start: 2024-11-06 | End: 2024-11-07 | Stop reason: HOSPADM

## 2024-11-06 RX ADMIN — DOCUSATE SODIUM 100 MG: 100 CAPSULE, LIQUID FILLED ORAL at 21:43

## 2024-11-06 RX ADMIN — Medication 166.7 ML: at 15:38

## 2024-11-06 RX ADMIN — IBUPROFEN 800 MG: 400 TABLET, FILM COATED ORAL at 16:05

## 2024-11-06 RX ADMIN — OXYTOCIN 2 MILLI-UNITS/MIN: 10 INJECTION, SOLUTION INTRAMUSCULAR; INTRAVENOUS at 08:39

## 2024-11-06 RX ADMIN — SODIUM CHLORIDE: 9 INJECTION, SOLUTION INTRAVENOUS at 08:36

## 2024-11-06 RX ADMIN — OXYTOCIN 87.3 MILLI-UNITS/MIN: 10 INJECTION, SOLUTION INTRAMUSCULAR; INTRAVENOUS at 15:51

## 2024-11-06 RX ADMIN — ACETAMINOPHEN 1000 MG: 500 TABLET ORAL at 21:43

## 2024-11-06 ASSESSMENT — PAIN DESCRIPTION - DESCRIPTORS: DESCRIPTORS: SORE

## 2024-11-06 ASSESSMENT — PAIN SCALES - GENERAL: PAINLEVEL_OUTOF10: 0

## 2024-11-06 NOTE — PROGRESS NOTES
Labor Progress Note  Patient seen, fetal heart rate and contraction pattern evaluated, patient examined. Discussed R/B AROM at this time with use of interpretor, pt agreeable for AROM.      Labor Hx:  IOL for GDM controled with Metformin  Grand multip.   AROM 1355    Fetal Surveillance   Mode: External US  Baseline Rate: 140 bpm  Baseline Classification: Normal  Variability: Moderate  Pattern: Accelerations  Baseline Rate: 140 bpm  Variability: Moderate    Contractions  Contraction Frequency: 2-4  Contraction Duration:        Maternal VS  BP (!) 109/53   Pulse 63   Temp 98.1 °F (36.7 °C)   Resp 15   LMP  (LMP Unknown)   SpO2 99%     CE:  Dil/Eff/Sta  Dilation (cm): 4  Effacement: 50  Station: -2     Assessment/Plan:  -Reassuring fetal status  -no contraction change  - Continuous monitoring  -continue pitocin   -AROM clear fluid   -Frequent position changes  -Anticipate , C/S if indicated  -Discussed with pt and family, agree    LENOAR Bermudez CNM   2024 2:05 PM

## 2024-11-06 NOTE — CONSULTS
Session ID: [id number]  Language: Michelle   ID: #[id number]   Name: Magnus Rojasage: Michelle   ID: #[id number]   Name: Daniella

## 2024-11-06 NOTE — PROGRESS NOTES
0710 patient arrives to L&D room 9 for scheduled induction.   0719 CNM Geeta at bedside using , patient speaks Michelle.

## 2024-11-06 NOTE — PROGRESS NOTES
1330 Report received from IAN Samaniego RN. Pt in bed resting,  at bedside.  used for assessment.     1355 CNM Geeta at bedside for SVE. Pt amenable to AROM if appropriate. Pt OOB to BR.    1400 SVE 4/50/-2. AROM for clear fluid. Pt tolerated well. All questions answered. Pt does not want an epidural at this time.    1500 Pt states contractions are getting stronger but she is coping well. Delivery table set. Denies need for epidural at this time, coping well. FOB at bedside.    1525 RN to bedside; pt heard yelling from nurses station. Observed delivered infant in bed. Spontaneous cry with good muscle tone observed. Infant placed on mothers abdomen, dried and stimulated with blanket. MD called to bedside.    1532 NAM CASTELANM to bedside.     1538 Placenta delivered spontaneously. Pitocin started per protocol.    1745 Pt tx to  with infant and all belongings

## 2024-11-06 NOTE — H&P
rx:  Pediatrician:  Circ:  Social-    Please see prenatal records for details.    Past Medical History:   Diagnosis Date    Gestational diabetes mellitus      No past surgical history on file.  Social History     Occupational History    Not on file   Tobacco Use    Smoking status: Never    Smokeless tobacco: Never   Substance and Sexual Activity    Alcohol use: Not Currently    Drug use: Never    Sexual activity: Yes     Family History   Problem Relation Age of Onset    No Known Problems Father     No Known Problems Mother        No Known Allergies  Prior to Admission medications    Medication Sig Start Date End Date Taking? Authorizing Provider   metFORMIN (GLUCOPHAGE) 500 MG tablet Take 1 tablet by mouth Daily with supper 10/31/24  Yes Javed Martin MD   aspirin 81 MG EC tablet Take 1 tablet by mouth daily  Patient not taking: Reported on 11/6/2024 10/31/24   Javed Martin MD   blood glucose monitor kit and supplies Use as directed to check blood glucose level at home 4 times daily: fasting, and 1hr after each meal. Please send matching in supplies that are covered by patient insurance  Patient not taking: Reported on 11/6/2024 8/23/24   Sae Brasher APRN - CNM   blood glucose monitor strips Use as directed to check blood glucose level at home 4 times daily: fasting, and 1hr after each meal.  Please send matching in supplies that are covered by patient insurance  Patient not taking: Reported on 11/6/2024 8/23/24   Sae Brasher APRN - CNM   Lancets MISC 1 each by Does not apply route 4 times daily Use as directed to check blood glucose level at home 4 times daily: fasting, and 1hr after each meal.  Please send matching in supplies that are covered by patient insurance  Patient not taking: Reported on 11/6/2024 8/23/24   Sae Brasher APRN - CNM   Prenatal Vit-Fe Fumarate-FA (PRENATAL VITAMIN) 27-0.8 MG TABS Take 1 tablet by mouth daily  Patient not taking: Reported on 10/2/2024 8/20/24    Sae Brasher APRN - CNM   magnesium oxide (MAG-OX) 400 MG tablet Take 1 tablet by mouth daily  Patient not taking: Reported on 10/24/2024 8/20/24   Sae Brasher APRN - CNM        Review of Systems: Constitutional: negative  Eyes: negative  Respiratory: negative  Cardiovascular: negative  Gastrointestinal: negative  Genitourinary:negative  Musculoskeletal:negative  Neurological: negative      Objective:     Vitals:  /71   Pulse 76   Temp 97.9 °F (36.6 °C) (Oral)   LMP  (LMP Unknown)   SpO2 99%      Physical Exam:  General NAD  CVS: RRR no r/mg  Pulmonary: CTAB  Abdm: gravid NT  Back: BONNIE CVA NT, spine NT  Extremities: trace to 1+ bilateral pedal edema    Cervical Exam:   2/30/-2    Fetal Presentation: Vertex  Membranes:  Intact   EFW: 7.5 lbs         Fetal Heart Rate:  reactive     [place]:        Prenatal Labs:   B POSITIVE          Assessment/Plan:     Plan:   Admit for Reassuring fetal status, Continue plan for vaginal delivery, induction of labor for GDM .    Group B Strep was negative.  IV hydration, T&S, CBC  Continuous Fetal/ [place] monitoring.   Regular meal while in latent labor and not on Pitocin. Clear liquid diet once started on Pitocin or in active labor.   Reviewed risks/benefits of all induction methods including Cytotec, rowley bulb catheter and Pitocin. Patient verbalized understanding.   Discussed that at the present time patient dilated and effaced for pitocin, will start start per protocol   Risks, benefits of treatment plan have been discussed.  Patient may have IV Fentanyl PRN for pain management while in latent labor. Consult anesthesia for epidural per patient request for pain management.   Labor induction/management consent signed. Patient agrees with plan of care.   POC glucose every 4 hours, until active labor then every 2 hours.   All questions answered.  Plan of care has been reviewed with patient and family    Signed By:  LENORA Bermudez CNM     November 6, 2024

## 2024-11-06 NOTE — PROGRESS NOTES
0735 Bedside shift change report given to Edil MONROE (oncoming nurse) by Yaa (offgoing nurse). Report included the following information Nurse Handoff Report, Intake/Output, MAR, and Recent Results.

## 2024-11-07 VITALS
OXYGEN SATURATION: 96 % | DIASTOLIC BLOOD PRESSURE: 72 MMHG | HEART RATE: 68 BPM | RESPIRATION RATE: 16 BRPM | TEMPERATURE: 98.2 F | SYSTOLIC BLOOD PRESSURE: 114 MMHG

## 2024-11-07 PROCEDURE — APPNB15 APP NON BILLABLE TIME 0-15 MINS: Performed by: ADVANCED PRACTICE MIDWIFE

## 2024-11-07 NOTE — PROGRESS NOTES
Postpartum Note  S/P , PPday #1  Ambulating and voiding without diff  Genoveva po and po meds well  Breastfeeding well established    O: VSS, Afebrile       Patient Vitals for the past 24 hrs:   Temp Pulse Resp BP SpO2   24 0244 97.9 °F (36.6 °C) 63 16 107/69 98 %   24 2135 97.9 °F (36.6 °C) 66 16 96/61 96 %   24 1745 98.1 °F (36.7 °C) 74 16 104/60 95 %   24 1734 -- 71 -- (!) 109/51 --   24 1703 -- 78 -- 105/71 --   24 1649 -- 84 -- 98/63 --   24 1633 -- 63 -- 108/72 --   24 1618 -- 75 -- 117/75 --   24 1603 -- 82 -- 116/71 --   24 1558 -- 81 -- 114/69 --   24 1548 -- 78 -- (!) 119/56 --   24 1507 -- 88 -- 131/75 --   24 1345 98.1 °F (36.7 °C) 63 15 (!) 109/53 99 %   24 1113 -- 62 -- 102/64 --   24 0915 -- 69 -- 108/68 --           Breasts soft, NT        FF @ U-1 ML, Lochia Small Rubra        Perineum Intact        Ext NT, No REP, Neg Wai's    A/P: Routine PP care          Maternal Education          Lactation consultation prn          Plan Discharge in am

## 2024-11-07 NOTE — DISCHARGE INSTRUCTIONS
????? ???? ?? ????. ??? ???? ??? ??? ???? ????? ?? ????? ???? ??? ?? ?? ?? ?????? ????? ?? ????. ?? ??? ??? ?? ????? ??? ?? ??? ?????? ?? ???? ? ??? ?? ????? ??? ????? ???? ??? ????? ???? ?? ???.  ???? ???? ?? ??? ??? ???? ???? ??????? ?? ?????? ?????? ?? ?? ??? ???? ??????? ? ??? ???? ??????? ?? ????????? ?? ????. ????? ???? ??????? ? ?? ???? ??? ?????.  ?? ????? ??? ????? ?? ???? ?? ???? ?? ???? ??? ?? ????:   ??? ????? ??? ????? ?? ???? ????? ???? ?? ??????? ???? ? ????? ?? ????? ??? ??? ????.  ???? ???? ? ???? ?? ????? ???? ?????.  ????? ???? ? ?? ???? ??? ????? ?? ??? ? ??? ???? ??? ?????? ?? ???.  ?? ?????? ???? ???? ???? ???? ?? ??????  ?? ??? ???? ?????? ????? ??? ?????.  ?? ??? ?? ?? ?? ???? ??? ?? ????? ??? ?????. ?? ??? ?? ?? ????? ??? ?? ????? ???? ?? ?? ??? ?? ?? ??? ???? ??? ?? ????? ????.  ????? ???? ??? ???? ?? ?? ???? ??? ????? ?? ????? ?? ???? ????? ???????. ???????? ?? ??? ????? ??? ??? ?? ???? ???? ?? ????? ?? ???? ??? ??? ???? ???. ??? ??? ?? ??? ???? ???? ??? ????? ?? ??? ?????? ???? ??? ??? ???? ??? ??? ???? ??? ?????? ?? ???? ???.  ??? ?? ??? ???? ?? ????? ?? ????? ?? ???? ????? ? ??????? ???? ????. ??? ?? ???? ??? ????? ?????? ?? ???? ???? ?? ???? ??????? ?? ?? ??? ?????. ???? ??? ?? ???? ??? ?? ???? ??? ?? ????? ?????? ?? ?? ??? ?????.  ?? ???? ???? ???? ??? ???? ???????  ?? ????? ??? ????? ?? ??????? ???? ?????? ?? ???? ?? ????? ???? ????? ????:  ????? ????? ???? ?????? ?? ????? ?? ????:   ?????? ???? ????? ????? ???? ?? ????? ?? ????? ????.  ???? ??? ??? ???? ? ????? ??? ?? ????.  ???? ??? ? ????? ?? ????.  ??.  ??? ???? ?? ??? 6 ???? ??? ??? ?????? ?????? ???.  ?? ??? ????? ??????? ?? ????? ?????? ??? ????? ? ?? ????? ??? ????? ?? ???? ??? ???? ??????:  ??? ???? ???? ???? ????? ??? ???? ???? ????.  ??? ?? ????? ??? ??? ?????? ????? ??? ? ??????? ?? ????.  ??? ???? ?? ?? ?????? ??? ????? ?????.  ?? ??? ?? ?????? ??????? ?????? ??? ?????  ??? ??   [email or web address]  ????  [id number] ?? ???? ????? ???? ??????? ????? ?????? \"??????: ?????? ??? ???????.\"  ?? ??? ?? ?????: 10 ????? 2023  ???? ????? ?????: 14.1  © 5031-3109 [employer].   ?????? ??? ??????? ?????? ???? ????? ????? ????? ??? ?????? ???? ??? ???. ??? ?????? ????? ????? ?? ??? ?????????? ?? ????? ?????? ????? ?? ?????? ?? ??????? ???? ????? ???? ????. [employer] ?????? ????? ???? ?? ???? ?? ???? ??????? ?? ??? ??????? ?? ?? ??? ??? ?? ???.

## 2024-11-07 NOTE — CONSULTS
Session ID: [id number]  Language: Michelle   ID: #[id number]   Name: Noé Jacobo  Writer and SUE Solano RN discussed discharge instructions with patient and spouse using  line. Thy both indicate understanding.

## 2024-11-07 NOTE — L&D DELIVERY NOTE
CNM called to room, pt delivered spontaneously by herself. Spontaneous delivery of intact placenta by lenny mechanism. Intact. Vaginal inspection - intact, bleeding minimal. Mom and baby skin to skin.      Justyn Moreno [[id number]]      Labor Events     Labor: No  Cervical Ripening Date/Time:        Rupture Date/Time:  24 13:58:00   Rupture Type: AROM  Fluid Color: Clear       Labor Length    3rd stage: 0h 13m       Delivery Details      Delivery Date: 24 Delivery Time: 15:25:00   Delivery Type: Vaginal, Spontaneous               Presentation    Presentation: Vertex       Shoulder Dystocia    Shoulder Dystocia Present?: No       Assisted Delivery Details    Forceps Attempted?: No  Vacuum Extractor Attempted?: No                           Cord    Complications: None  Delayed Cord Clamping?: Yes  Cord Blood Disposition: Discard  Gases Sent?: No              Placenta    Date/Time: 2024 15:38:57  Removal: Spontaneous  Appearance: Intact  Disposition: Discarded       Lacerations           Vaginal Counts    Initial Count Personnel: IAN ELLISON RN  Initial Count Verified By: PK CHAVEZ RN  Intial Sponge Count: Correct Intial Needles Count: Correct Intial Instruments Count: Correct          Blood Loss  Mother: Azul Moreno #[id number]     Start of Mother's Information      Delivery Blood Loss   Intrapartum & Postpartum: 24 - 24 2335    Delivery Admission: 24 - 24 233         Intrapartum & Postpartum Delivery Admission    None                  End of Mother's Information  Mother: Azul Moreno #[id number]                Delivery Providers    Delivering clinician: Itzel Rice APRN - KIRTI     Provider Role     Obstetrician    Amanda Chavez RN Primary Nurse    Luz Rand RN Primary  Nurse     NICU Nurse     Neonatologist     Anesthesiologist     Nurse Anesthetist     Nurse Practitioner     Midwife     Nursery Nurse

## 2024-11-07 NOTE — LACTATION NOTE
This note was copied from a baby's chart.  Per mom, infant has been latching well. Infant had a long stretch during the night between feeds. Educated mom on the importance of feeding infant consistently and frequently for adequate milk production. Mom expresses understanding. Assistance with latching provided; mom declines the need for assistance.

## 2024-11-16 NOTE — DISCHARGE SUMMARY
Discharge Summary    Date: 11/16/2024  Patient Name: Azul Moreno    YOB: 1995     Age: 29 y.o.    Admit Date: 11/6/2024  Discharge Date: 11/16/2024  Discharge Condition: Good    Admission Diagnosis  Encounter for planned induction of labor [Z34.90]      Discharge Diagnosis  Principal Problem:    Encounter for planned induction of labor  Active Problems:    Vaginal delivery  Resolved Problems:    * No resolved hospital problems. *      Hospital Stay  Narrative of Hospital Course:  Normal course    Consultants:  IP CONSULT TO ANESTHESIOLOGY  IP CONSULT TO LACTATION    Surgeries/procedures Performed:      Treatments:            Discharge Plan/Disposition:  Home    Hospital/Incidental Findings Requiring Follow Up:    Patient Instructions:    Diet:    Activity:  For number of days (if applicable):      Other Instructions:    Provider Follow-Up:   No follow-ups on file.     Significant Diagnostic Studies:    Recent Labs:  Admission on 11/06/2024, Discharged on 11/07/2024  WBC                                           Date: 11/06/2024  Value: 5.0         Ref range: 3.6 - 11.0 K/uL    Status: Final  RBC                                           Date: 11/06/2024  Value: 3.37 (L)    Ref range: 3.80 - 5.20 M/uL   Status: Final  Hemoglobin                                    Date: 11/06/2024  Value: 9.8 (L)     Ref range: 11.5 - 16.0 g/dL   Status: Final  Hematocrit                                    Date: 11/06/2024  Value: 29.6 (L)    Ref range: 35.0 - 47.0 %      Status: Final  MCV                                           Date: 11/06/2024  Value: 87.8        Ref range: 80.0 - 99.0 FL     Status: Final  MCH                                           Date: 11/06/2024  Value: 29.1        Ref range: 26.0 - 34.0 PG     Status: Final  MCHC                                          Date: 11/06/2024  Value: 33.1        Ref range: 30.0 - 36.5 g/dL   Status: Final  RDW                                           Date:

## 2024-11-18 ENCOUNTER — TELEPHONE (OUTPATIENT)
Age: 29
End: 2024-11-18

## 2024-11-18 NOTE — TELEPHONE ENCOUNTER
Called patient to discuss need to go to [place] due to headache per Davi Naranjo. Patient expressed understanding and agreed. 6 week PP scheduled.     : Sandra  ID# [id number]

## 2024-12-16 ENCOUNTER — POSTPARTUM VISIT (OUTPATIENT)
Age: 29
End: 2024-12-16

## 2024-12-16 VITALS
BODY MASS INDEX: 30.99 KG/M2 | HEART RATE: 85 BPM | DIASTOLIC BLOOD PRESSURE: 70 MMHG | OXYGEN SATURATION: 95 % | WEIGHT: 164 LBS | SYSTOLIC BLOOD PRESSURE: 108 MMHG

## 2024-12-16 DIAGNOSIS — Z34.90 PREGNANCY, UNSPECIFIED GESTATIONAL AGE: ICD-10-CM

## 2024-12-16 DIAGNOSIS — Z91.89 BREASTFEEDING PROBLEM: Primary | ICD-10-CM

## 2024-12-16 PROCEDURE — 0503F POSTPARTUM CARE VISIT: CPT | Performed by: ADVANCED PRACTICE MIDWIFE

## 2024-12-16 RX ORDER — METOCLOPRAMIDE 10 MG/1
10 TABLET ORAL
Qty: 42 TABLET | Refills: 0 | Status: SHIPPED | OUTPATIENT
Start: 2024-12-16 | End: 2024-12-30

## 2024-12-16 RX ORDER — BREAST PUMP
1 EACH MISCELLANEOUS AS NEEDED
Qty: 1 EACH | Refills: 0 | Status: SHIPPED | OUTPATIENT
Start: 2024-12-16

## 2024-12-16 NOTE — PROGRESS NOTES
Session Code-[id number]   [facility]#[id number]    Postpartum Visit    Azul Moreno is a 29 y.o.  presenting for her postpartum visit.  She delivered on 11/6/24 by Itzel Rice APRN - CNM  and delivery was uncomplicated.  She has been doing well since discharged from the hospital with a normal postpartum course.    Bleeding -no   Perineal/Incisional pain - no  Mood -feels good/no depression  Feeding - breastfeeding and formula feeding  Contraception - she is interested        Past Medical History:   Diagnosis Date    Gestational diabetes mellitus        Social History     Socioeconomic History    Marital status:      Spouse name: Not on file    Number of children: Not on file    Years of education: Not on file    Highest education level: Not on file   Occupational History    Not on file   Tobacco Use    Smoking status: Never    Smokeless tobacco: Never   Substance and Sexual Activity    Alcohol use: Not Currently    Drug use: Never    Sexual activity: Yes   Other Topics Concern    Not on file   Social History Narrative    Not on file     Social Determinants of Health     Financial Resource Strain: Low Risk  (8/20/2024)    Overall Financial Resource Strain (CARDIA)     Difficulty of Paying Living Expenses: Not very hard   Food Insecurity: No Food Insecurity (8/20/2024)    Hunger Vital Sign     Worried About Running Out of Food in the Last Year: Never true     Ran Out of Food in the Last Year: Never true   Transportation Needs: Unknown (8/20/2024)    PRAPARE - Transportation     Lack of Transportation (Medical): Not on file     Lack of Transportation (Non-Medical): No   Physical Activity: Not on file   Stress: Not on file   Social Connections: Not on file   Intimate Partner Violence: Not on file   Housing Stability: Unknown (8/20/2024)    Housing Stability Vital Sign     Unable to Pay for Housing in the Last Year: Not on file     Number of Times Moved in the Last Year: Not on file     Homeless in the Last 
Verbal order obtained from Geeta Castillo CNM for [facility] referral (having trouble affording diapers). Order read back to CNM. Orders signed by this writer and sent for co-sign to CNM. [employer]fin msg sent to patient advising them referral has been placed on their behalf.Yadira Guillermo LPN  12/16/2024  12:18 PM      
paravaginal defects, no discharge present, no inflammatory lesions present, no masses present  Bladder: non-tender to palpation  Urethra: appears normal  Cervix: normal   Uterus: normal size, shape and consistency  Adnexa: no adnexal tenderness present, no adnexal masses present  Perineum: perineum within normal limits, no evidence of trauma, no rashes or skin lesions present  Anus: anus within normal limits, no hemorrhoids present  Inguinal Lymph Nodes: no lymphadenopathy present    Skin  General Inspection: no rash, no lesions identified    Neurologic/Psychiatric  Mental Status:  Orientation: grossly oriented to person, place and time  Mood and Affect: mood normal, affect appropriate    Assessment/Plan:  Normal postpartum check  Denies discomfort to vaginal area  Reviewed S/S PPD  both breast and bottle  Contraception reviewed:   Will return for Nexplanon Placement   RTO for AE, will do with nexplanon placement   Discussed options for help with finances, and diapers, information given, will send referral for [facility].   Will need GTT for GDM, will schedule lab only appointment.         LENORA Bermudez CNM

## 2024-12-16 NOTE — PATIENT INSTRUCTIONS
Opportunity Centers:  [employer]   What they offer: free coaching services in the areas of Employment, Financial, and Benefits Access   Phone Number: 180.344.5967   Address: [place] [address]   Website: [email or web address]://www.[employer].org/economic-resource-center/rjsltkrlp-aaaztfiynvx-okneah-[place]/          [facility]   What they offer: free coaching services in the areas of Employment, Financial, and Benefits Access   Phone Number: 130.535.1437   Email: info@[employer].org   Address: [address] [place] [address]   Website: [email or web address]://[email or web address]/[facility]-works-a-rumw-dajduoifg-xboktmzfhzf-center/       [facility]   What they offer: free coaching services in the areas of Employment, Financial, and Benefits Access   Phone Number: [phone]   Email: community@[email or web address]   Address: [address] [place] [address]                         SNAP (formerly [employer] [place])  What they offer: SNAP is used like cash to buy eligible food items from authorized retailers.  Apply for benefits online: https://www.[employer].[place].gov/  Apply for benefits by phone: 821.814.7797 (M-F 8AM - 7PM; Sat 9AM - 12PM)  [employer] Hunger Hotline  What they offer:  The [employer] Hunger Hotline connects individuals in need of food with a local food pantry or program across [address] [facility] and [facility] in [facility] [place].   Website: https://[employer]/store-/ (search zip code)   Hunger Hotline Inquiry Form: https://[employer].org/hunger-hotline/  Hunger Hotline Phone Number:  804-521-2500 x [phone] (M-F 9:00AM-4:00PM)    Meals on Wheels  Meals on Wheels is a program that delivers meals to individuals who have no reliable means for maintaining a healthy diet.  Services are provided by area.     [facility] Service Area:   [facility], [place], and [place].  [facility], [place], [facility],

## 2025-01-03 ENCOUNTER — OFFICE VISIT (OUTPATIENT)
Age: 30
End: 2025-01-03

## 2025-01-03 VITALS
BODY MASS INDEX: 33.07 KG/M2 | OXYGEN SATURATION: 96 % | DIASTOLIC BLOOD PRESSURE: 68 MMHG | WEIGHT: 175 LBS | SYSTOLIC BLOOD PRESSURE: 106 MMHG | HEART RATE: 81 BPM | TEMPERATURE: 98.1 F

## 2025-01-03 DIAGNOSIS — Z30.017 NEXPLANON INSERTION: Primary | ICD-10-CM

## 2025-01-03 NOTE — PROGRESS NOTES
Chief Complaint   Patient presents with    Postpartum Care        /68 (Site: Right Upper Arm, Position: Sitting, Cuff Size: Large Adult)   Pulse 81   Temp 98.1 °F (36.7 °C) (Oral)   Wt 79.4 kg (175 lb)   SpO2 96%   BMI 33.07 kg/m²     Pain Scale: 2/10  Pain Location: Face     Current Outpatient Medications on File Prior to Visit   Medication Sig Dispense Refill    metoclopramide (REGLAN) 10 MG tablet Take 1 tablet by mouth 3 times daily (with meals) for 14 days 42 tablet 0    Misc. Devices (BREAST PUMP) MISC 1 each by Does not apply route as needed (breastfeeding infant) (Patient not taking: Reported on 1/3/2025) 1 each 0    aspirin 81 MG EC tablet Take 1 tablet by mouth daily (Patient not taking: Reported on 11/6/2024) 90 tablet 0    metFORMIN (GLUCOPHAGE) 500 MG tablet Take 1 tablet by mouth Daily with supper (Patient not taking: Reported on 12/16/2024) 180 tablet 1    blood glucose monitor kit and supplies Use as directed to check blood glucose level at home 4 times daily: fasting, and 1hr after each meal. Please send matching in supplies that are covered by patient insurance (Patient not taking: Reported on 1/3/2025) 1 kit 0    blood glucose monitor strips Use as directed to check blood glucose level at home 4 times daily: fasting, and 1hr after each meal.  Please send matching in supplies that are covered by patient insurance (Patient not taking: Reported on 1/3/2025) 100 strip 2    Lancets MISC 1 each by Does not apply route 4 times daily Use as directed to check blood glucose level at home 4 times daily: fasting, and 1hr after each meal.  Please send matching in supplies that are covered by patient insurance (Patient not taking: Reported on 1/3/2025) 100 each 2    Prenatal Vit-Fe Fumarate-FA (PRENATAL VITAMIN) 27-0.8 MG TABS Take 1 tablet by mouth daily (Patient not taking: Reported on 10/2/2024) 90 tablet 5    magnesium oxide (MAG-OX) 400 MG tablet Take 1 tablet by mouth daily (Patient not taking:

## 2025-02-05 ENCOUNTER — TELEPHONE (OUTPATIENT)
Age: 30
End: 2025-02-05

## 2025-02-05 NOTE — TELEPHONE ENCOUNTER
Rosalio [id number] Used to call patient about her appt that she have for tomorrow to let her know to come in earlier.

## 2025-02-06 ENCOUNTER — LAB (OUTPATIENT)
Age: 30
End: 2025-02-06

## 2025-02-06 DIAGNOSIS — O24.419 GESTATIONAL DIABETES MELLITUS (GDM) IN THIRD TRIMESTER, GESTATIONAL DIABETES METHOD OF CONTROL UNSPECIFIED: Primary | ICD-10-CM

## 2025-02-06 LAB
GLUCOSE 1 HOUR: 290 MG/DL
GLUCOSE P FAST SERPL-MCNC: 124 MG/DL (ref 65–100)
GLUCOSE TOLERANCE TEST 2 HOUR: 264 MG/DL (ref 65–140)
GLUCOSE TOLERANCE: ABNORMAL
GLUCOSE, 1/2 HOUR: ABNORMAL MG/DL

## 2025-02-20 DIAGNOSIS — O24.419 GESTATIONAL DIABETES MELLITUS (GDM) IN THIRD TRIMESTER, GESTATIONAL DIABETES METHOD OF CONTROL UNSPECIFIED: Primary | ICD-10-CM

## 2025-02-25 ENCOUNTER — OFFICE VISIT (OUTPATIENT)
Age: 30
End: 2025-02-25
Payer: COMMERCIAL

## 2025-02-25 VITALS — BODY MASS INDEX: 33.61 KG/M2 | WEIGHT: 178 LBS | HEIGHT: 61 IN

## 2025-02-25 DIAGNOSIS — E11.65 TYPE 2 DIABETES MELLITUS WITH HYPERGLYCEMIA, WITHOUT LONG-TERM CURRENT USE OF INSULIN (HCC): Primary | ICD-10-CM

## 2025-02-25 PROCEDURE — G2211 COMPLEX E/M VISIT ADD ON: HCPCS | Performed by: INTERNAL MEDICINE

## 2025-02-25 PROCEDURE — 3044F HG A1C LEVEL LT 7.0%: CPT | Performed by: INTERNAL MEDICINE

## 2025-02-25 PROCEDURE — 99204 OFFICE O/P NEW MOD 45 MIN: CPT | Performed by: INTERNAL MEDICINE

## 2025-02-25 NOTE — PROGRESS NOTES
Chief Complaint   Patient presents with    Diabetes    New Patient     Due to language barrier, an  was present during the history-taking and subsequent discussion (and for part of the physical exam) with this patient.  #[id number] Vania    * New Patient Visit     General:  Recent gestational diabetes (baby came in Nov)- no insulin, maybe metformin - stopped after had baby   No Hx DM or pre-DM before the pregnancy     145 this AM - that is the highest she is seeing now     Nursing -no     medicaid    Labs/Studies    2/6/24 --[id number]    Past Medical History:   Diagnosis Date    Gestational diabetes mellitus           2/25/2025    11:25 AM 1/3/2025    10:23 AM 12/16/2024    10:59 AM 10/31/2024     1:39 PM 10/25/2024    12:05 PM 10/2/2024    10:18 AM 9/17/2024    10:40 AM   Weight Metrics   Weight 178 lb 175 lb 164 lb 167 lb 167 lb 6.4 oz 166 lb 9.6 oz 164 lb 6.4 oz   BMI (Calculated) 33.7 kg/m2 0 kg/m2 0 kg/m2 0 kg/m2 31.7 kg/m2 31.5 kg/m2 31.1 kg/m2        EXAM:  - not done today     Assessment/Plan:   1. Type 2 diabetes mellitus with hyperglycemia, without long-term current use of insulin (HCC)  Sugars don't seem that bad but don't have levels later in day  Sample CGM given to see sugars 24/7 to find problem areas and learn what foods cause high sugars so can avoid them  Referred to [facility] but think will decline despite me insisting on the importance of education for diabetes  Until have sugars, not clear if medications are needed    No orders of the defined types were placed in this encounter.     Orders Placed This Encounter   Procedures    Hemoglobin A1C    Fructosamine    Ambulatory referral to Diabetic Education     Referral Priority:   Routine     Referral Type:   Eval and Treat     Referral Reason:   Specialty Services Required     Number of Visits Requested:   1        Return in about 4 months (around 6/25/2025).

## 2025-02-26 LAB
FRUCTOSAMINE SERPL-SCNC: 276 UMOL/L (ref 0–285)
HBA1C MFR BLD: 5.9 % (ref 4.8–5.6)

## 2025-03-19 ENCOUNTER — OFFICE VISIT (OUTPATIENT)
Age: 30
End: 2025-03-19
Payer: COMMERCIAL

## 2025-03-19 VITALS
DIASTOLIC BLOOD PRESSURE: 72 MMHG | OXYGEN SATURATION: 97 % | TEMPERATURE: 98.2 F | HEIGHT: 61 IN | WEIGHT: 175 LBS | HEART RATE: 75 BPM | BODY MASS INDEX: 33.04 KG/M2 | SYSTOLIC BLOOD PRESSURE: 112 MMHG | RESPIRATION RATE: 16 BRPM

## 2025-03-19 DIAGNOSIS — N92.6 IRREGULAR PERIODS: Primary | ICD-10-CM

## 2025-03-19 PROCEDURE — 99212 OFFICE O/P EST SF 10 MIN: CPT

## 2025-03-19 SDOH — ECONOMIC STABILITY: FOOD INSECURITY: WITHIN THE PAST 12 MONTHS, YOU WORRIED THAT YOUR FOOD WOULD RUN OUT BEFORE YOU GOT MONEY TO BUY MORE.: NEVER TRUE

## 2025-03-19 SDOH — ECONOMIC STABILITY: FOOD INSECURITY: WITHIN THE PAST 12 MONTHS, THE FOOD YOU BOUGHT JUST DIDN'T LAST AND YOU DIDN'T HAVE MONEY TO GET MORE.: NEVER TRUE

## 2025-03-19 ASSESSMENT — PATIENT HEALTH QUESTIONNAIRE - PHQ9
SUM OF ALL RESPONSES TO PHQ QUESTIONS 1-9: 0
1. LITTLE INTEREST OR PLEASURE IN DOING THINGS: NOT AT ALL
SUM OF ALL RESPONSES TO PHQ QUESTIONS 1-9: 0
2. FEELING DOWN, DEPRESSED OR HOPELESS: NOT AT ALL

## 2025-03-19 NOTE — PROGRESS NOTES
Azul Moreno is a 29 y.o. female presents for a problem visit.    Chief Complaint   Patient presents with    Other     Irregular menstrual cycles with nexplanon     No LMP recorded (lmp unknown). (Menstrual status: Other - See Notes).  Birth Control: nexplanon.    The patient states that since she had her nexplanon inserted she has had irregular menstrual cycles lasting 15 days, and has had abdominal bloating, headache, and back pain with irritability.        1. Have you been to the ER, urgent care clinic, or hospitalized since your last visit? No    2. Have you seen or consulted any other health care providers outside of the [facility] since your last visit? No     Zhane Shaw RN.  
appropriate    Assessment:   Irregular bleeding with nexplanon    Plan:   Reviewed side effects of nexplanon. Pt and FOB hesitant about removal due to concerns of unwanted pregnancy.   Plans to leave in, try one month COCs.   If no relief with bleeding, plan for removal.   Pt aware COCs may only temporarily relieve symptoms.   Patient declines presence of chaperone during today's visit.     Sae Brasher, APRPAKO - CNM